# Patient Record
Sex: FEMALE | Race: WHITE | NOT HISPANIC OR LATINO | Employment: UNEMPLOYED | URBAN - METROPOLITAN AREA
[De-identification: names, ages, dates, MRNs, and addresses within clinical notes are randomized per-mention and may not be internally consistent; named-entity substitution may affect disease eponyms.]

---

## 2023-09-16 PROBLEM — E78.01 ESSENTIAL FAMILIAL HYPERCHOLESTEROLEMIA: Status: ACTIVE | Noted: 2023-09-16

## 2023-09-16 PROBLEM — E03.9 ACQUIRED HYPOTHYROIDISM: Status: ACTIVE | Noted: 2023-09-16

## 2023-09-16 PROBLEM — I10 BENIGN ESSENTIAL HYPERTENSION: Status: ACTIVE | Noted: 2023-09-16

## 2023-09-16 PROBLEM — F02.80 ALZHEIMER'S DISEASE WITH LATE ONSET (MULTI): Status: ACTIVE | Noted: 2023-09-16

## 2023-09-16 PROBLEM — G47.00 INSOMNIA: Status: ACTIVE | Noted: 2023-09-16

## 2023-09-16 PROBLEM — G62.9 NEUROPATHY: Status: ACTIVE | Noted: 2023-09-16

## 2023-09-16 PROBLEM — R53.83 FATIGUE: Status: ACTIVE | Noted: 2023-09-16

## 2023-09-16 PROBLEM — R07.9 CHEST PAIN: Status: ACTIVE | Noted: 2023-09-16

## 2023-09-16 PROBLEM — F43.21 GRIEF: Status: ACTIVE | Noted: 2023-09-16

## 2023-09-16 PROBLEM — F32.9 REACTIVE DEPRESSION: Status: ACTIVE | Noted: 2023-09-16

## 2023-09-16 PROBLEM — E11.9 DIABETES MELLITUS WITHOUT COMPLICATION (MULTI): Status: ACTIVE | Noted: 2023-09-16

## 2023-09-16 PROBLEM — G30.1 ALZHEIMER'S DISEASE WITH LATE ONSET (MULTI): Status: ACTIVE | Noted: 2023-09-16

## 2023-09-16 PROBLEM — R30.0 DYSURIA: Status: ACTIVE | Noted: 2023-09-16

## 2023-09-16 PROBLEM — M54.30 SCIATICA: Status: ACTIVE | Noted: 2023-09-16

## 2023-09-16 PROBLEM — E11.42 TYPE 2 DIABETES MELLITUS WITH DIABETIC POLYNEUROPATHY, WITHOUT LONG-TERM CURRENT USE OF INSULIN (MULTI): Status: ACTIVE | Noted: 2023-09-16

## 2023-09-16 PROBLEM — E78.00 ELEVATED CHOLESTEROL: Status: ACTIVE | Noted: 2023-09-16

## 2023-09-16 PROBLEM — D50.9 IRON DEFICIENCY ANEMIA: Status: ACTIVE | Noted: 2023-09-16

## 2023-09-16 PROBLEM — G47.01 INSOMNIA DUE TO MEDICAL CONDITION: Status: ACTIVE | Noted: 2023-09-16

## 2023-09-16 RX ORDER — FERROUS SULFATE 325(65) MG
TABLET ORAL
COMMUNITY
Start: 2013-08-22 | End: 2024-03-28 | Stop reason: ALTCHOICE

## 2023-09-16 RX ORDER — NATEGLINIDE 120 MG/1
TABLET ORAL
COMMUNITY
Start: 2015-09-10 | End: 2024-03-28 | Stop reason: ALTCHOICE

## 2023-09-16 RX ORDER — GABAPENTIN 300 MG/1
300 CAPSULE ORAL 2 TIMES DAILY
COMMUNITY
Start: 2016-08-08 | End: 2024-05-20

## 2023-09-16 RX ORDER — DONEPEZIL HYDROCHLORIDE 10 MG/1
10 TABLET, FILM COATED ORAL DAILY
COMMUNITY
End: 2024-01-22

## 2023-09-16 RX ORDER — BLOOD SUGAR DIAGNOSTIC
STRIP MISCELLANEOUS
COMMUNITY
Start: 2014-09-08 | End: 2024-03-28 | Stop reason: SDUPTHER

## 2023-09-16 RX ORDER — ACETAMINOPHEN 500 MG
TABLET ORAL
COMMUNITY
End: 2024-03-28 | Stop reason: ALTCHOICE

## 2023-09-16 RX ORDER — CYCLOBENZAPRINE HCL 10 MG
5 TABLET ORAL 3 TIMES DAILY PRN
COMMUNITY
End: 2024-03-28 | Stop reason: ALTCHOICE

## 2023-09-16 RX ORDER — ATORVASTATIN CALCIUM 20 MG/1
20 TABLET, FILM COATED ORAL DAILY
COMMUNITY
Start: 2013-08-22

## 2023-09-16 RX ORDER — METOPROLOL SUCCINATE 50 MG/1
TABLET, EXTENDED RELEASE ORAL
COMMUNITY
Start: 2022-09-27

## 2023-09-16 RX ORDER — METFORMIN HYDROCHLORIDE 500 MG/1
500 TABLET ORAL
COMMUNITY
Start: 2015-08-05 | End: 2024-03-28 | Stop reason: ALTCHOICE

## 2023-09-16 RX ORDER — GLIPIZIDE 5 MG/1
5 TABLET ORAL
COMMUNITY
Start: 2013-08-22 | End: 2024-01-22

## 2023-09-16 RX ORDER — LEVOTHYROXINE SODIUM 50 UG/1
50 TABLET ORAL
COMMUNITY
Start: 2013-08-22 | End: 2024-03-28 | Stop reason: ALTCHOICE

## 2023-09-16 RX ORDER — MEMANTINE HYDROCHLORIDE 28 MG/1
CAPSULE, EXTENDED RELEASE ORAL
COMMUNITY
End: 2024-01-22

## 2023-09-16 RX ORDER — NYSTATIN 100000 U/G
1 CREAM TOPICAL 2 TIMES DAILY
COMMUNITY
Start: 2022-12-13 | End: 2024-03-28 | Stop reason: ALTCHOICE

## 2023-09-16 RX ORDER — METOPROLOL TARTRATE 50 MG/1
25 TABLET ORAL 2 TIMES DAILY
COMMUNITY
Start: 2013-08-22 | End: 2024-03-28 | Stop reason: ALTCHOICE

## 2023-09-16 RX ORDER — TEMAZEPAM 15 MG/1
CAPSULE ORAL
COMMUNITY
Start: 2015-12-22 | End: 2023-11-27 | Stop reason: SDUPTHER

## 2023-11-27 ENCOUNTER — TELEPHONE (OUTPATIENT)
Dept: PRIMARY CARE | Facility: CLINIC | Age: 83
End: 2023-11-27
Payer: MEDICARE

## 2023-11-27 DIAGNOSIS — G47.01 INSOMNIA DUE TO MEDICAL CONDITION: Primary | ICD-10-CM

## 2023-11-27 NOTE — TELEPHONE ENCOUNTER
Pt's daughter is calling for a Refill: Temazepam 15 mg caps 1 cap at bedtime # 30 Pharm: [facility]

## 2023-11-28 RX ORDER — TEMAZEPAM 15 MG/1
15 CAPSULE ORAL NIGHTLY
Qty: 30 CAPSULE | Refills: 2 | Status: SHIPPED | OUTPATIENT
Start: 2023-11-28 | End: 2024-03-17

## 2023-12-19 ENCOUNTER — APPOINTMENT (OUTPATIENT)
Dept: PRIMARY CARE | Facility: CLINIC | Age: 83
End: 2023-12-19
Payer: MEDICARE

## 2024-01-12 ENCOUNTER — APPOINTMENT (OUTPATIENT)
Dept: PRIMARY CARE | Facility: CLINIC | Age: 84
End: 2024-01-12
Payer: MEDICARE

## 2024-01-21 DIAGNOSIS — E11.9 TYPE 2 DIABETES MELLITUS WITHOUT COMPLICATIONS (MULTI): ICD-10-CM

## 2024-01-22 RX ORDER — MEMANTINE HYDROCHLORIDE 28 MG/1
28 CAPSULE, EXTENDED RELEASE ORAL DAILY
Qty: 90 CAPSULE | Refills: 0 | Status: SHIPPED | OUTPATIENT
Start: 2024-01-22 | End: 2024-05-20

## 2024-01-22 RX ORDER — GLIPIZIDE 5 MG/1
10 TABLET ORAL 2 TIMES DAILY
Qty: 360 TABLET | Refills: 0 | Status: SHIPPED | OUTPATIENT
Start: 2024-01-22 | End: 2024-05-20

## 2024-01-22 RX ORDER — DONEPEZIL HYDROCHLORIDE 10 MG/1
10 TABLET, FILM COATED ORAL NIGHTLY
Qty: 90 TABLET | Refills: 0 | Status: SHIPPED | OUTPATIENT
Start: 2024-01-22 | End: 2024-05-20

## 2024-03-11 ENCOUNTER — APPOINTMENT (OUTPATIENT)
Dept: PRIMARY CARE | Facility: CLINIC | Age: 84
End: 2024-03-11
Payer: MEDICARE

## 2024-03-17 DIAGNOSIS — G47.01 INSOMNIA DUE TO MEDICAL CONDITION: ICD-10-CM

## 2024-03-17 RX ORDER — TEMAZEPAM 15 MG/1
15 CAPSULE ORAL NIGHTLY
Qty: 30 CAPSULE | Refills: 1 | Status: SHIPPED | OUTPATIENT
Start: 2024-03-17 | End: 2024-06-15

## 2024-03-28 ENCOUNTER — LAB (OUTPATIENT)
Dept: LAB | Facility: LAB | Age: 84
End: 2024-03-28
Payer: MEDICARE

## 2024-03-28 ENCOUNTER — OFFICE VISIT (OUTPATIENT)
Dept: PRIMARY CARE | Facility: CLINIC | Age: 84
End: 2024-03-28
Payer: MEDICARE

## 2024-03-28 VITALS
BODY MASS INDEX: 23.19 KG/M2 | SYSTOLIC BLOOD PRESSURE: 134 MMHG | HEART RATE: 62 BPM | HEIGHT: 62 IN | DIASTOLIC BLOOD PRESSURE: 68 MMHG | OXYGEN SATURATION: 96 % | WEIGHT: 126 LBS

## 2024-03-28 DIAGNOSIS — F51.01 PRIMARY INSOMNIA: ICD-10-CM

## 2024-03-28 DIAGNOSIS — E78.2 MIXED HYPERLIPIDEMIA: ICD-10-CM

## 2024-03-28 DIAGNOSIS — N18.31 STAGE 3A CHRONIC KIDNEY DISEASE (MULTI): ICD-10-CM

## 2024-03-28 DIAGNOSIS — E11.42 TYPE 2 DIABETES MELLITUS WITH DIABETIC POLYNEUROPATHY, WITHOUT LONG-TERM CURRENT USE OF INSULIN (MULTI): ICD-10-CM

## 2024-03-28 DIAGNOSIS — Z76.89 ENCOUNTER TO ESTABLISH CARE: Primary | ICD-10-CM

## 2024-03-28 DIAGNOSIS — Z79.899 CONTROLLED SUBSTANCE AGREEMENT SIGNED: ICD-10-CM

## 2024-03-28 DIAGNOSIS — F02.80 ALZHEIMER'S DISEASE WITH LATE ONSET (MULTI): ICD-10-CM

## 2024-03-28 DIAGNOSIS — G30.1 ALZHEIMER'S DISEASE WITH LATE ONSET (MULTI): ICD-10-CM

## 2024-03-28 PROBLEM — G47.01 INSOMNIA DUE TO MEDICAL CONDITION: Status: RESOLVED | Noted: 2023-09-16 | Resolved: 2024-03-28

## 2024-03-28 PROBLEM — E11.9 DIABETES MELLITUS WITHOUT COMPLICATION (MULTI): Status: RESOLVED | Noted: 2023-09-16 | Resolved: 2024-03-28

## 2024-03-28 PROBLEM — R30.0 DYSURIA: Status: RESOLVED | Noted: 2023-09-16 | Resolved: 2024-03-28

## 2024-03-28 PROBLEM — E78.00 ELEVATED CHOLESTEROL: Status: RESOLVED | Noted: 2023-09-16 | Resolved: 2024-03-28

## 2024-03-28 PROBLEM — R07.9 CHEST PAIN: Status: RESOLVED | Noted: 2023-09-16 | Resolved: 2024-03-28

## 2024-03-28 PROBLEM — E03.9 ACQUIRED HYPOTHYROIDISM: Status: RESOLVED | Noted: 2023-09-16 | Resolved: 2024-03-28

## 2024-03-28 PROBLEM — G62.9 NEUROPATHY: Status: RESOLVED | Noted: 2023-09-16 | Resolved: 2024-03-28

## 2024-03-28 PROBLEM — R53.83 FATIGUE: Status: RESOLVED | Noted: 2023-09-16 | Resolved: 2024-03-28

## 2024-03-28 PROBLEM — E78.01 ESSENTIAL FAMILIAL HYPERCHOLESTEROLEMIA: Status: RESOLVED | Noted: 2023-09-16 | Resolved: 2024-03-28

## 2024-03-28 PROBLEM — I10 BENIGN ESSENTIAL HYPERTENSION: Status: RESOLVED | Noted: 2023-09-16 | Resolved: 2024-03-28

## 2024-03-28 LAB
ALBUMIN SERPL-MCNC: 4.4 G/DL (ref 3.5–5)
ALP BLD-CCNC: 99 U/L (ref 35–125)
ALT SERPL-CCNC: 35 U/L (ref 5–40)
AMPHETAMINES UR QL SCN>1000 NG/ML: NEGATIVE
ANION GAP SERPL CALC-SCNC: 12 MMOL/L
AST SERPL-CCNC: 35 U/L (ref 5–40)
BARBITURATES UR QL SCN>300 NG/ML: NEGATIVE
BASOPHILS # BLD AUTO: 0.07 X10*3/UL (ref 0–0.1)
BASOPHILS NFR BLD AUTO: 1.1 %
BENZODIAZ UR QL SCN>300 NG/ML: NEGATIVE
BILIRUB SERPL-MCNC: 0.7 MG/DL (ref 0.1–1.2)
BUN SERPL-MCNC: 10 MG/DL (ref 8–25)
BZE UR QL SCN>300 NG/ML: NEGATIVE
CALCIUM SERPL-MCNC: 9.7 MG/DL (ref 8.5–10.4)
CANNABINOIDS UR QL SCN>50 NG/ML: NEGATIVE
CHLORIDE SERPL-SCNC: 99 MMOL/L (ref 97–107)
CHOLEST SERPL-MCNC: 150 MG/DL (ref 133–200)
CHOLEST/HDLC SERPL: 2.1 {RATIO}
CO2 SERPL-SCNC: 25 MMOL/L (ref 24–31)
CREAT SERPL-MCNC: 1 MG/DL (ref 0.4–1.6)
CREAT UR-MCNC: 60.7 MG/DL
EGFRCR SERPLBLD CKD-EPI 2021: 56 ML/MIN/1.73M*2
EOSINOPHIL # BLD AUTO: 0.11 X10*3/UL (ref 0–0.4)
EOSINOPHIL NFR BLD AUTO: 1.8 %
ERYTHROCYTE [DISTWIDTH] IN BLOOD BY AUTOMATED COUNT: 14.9 % (ref 11.5–14.5)
EST. AVERAGE GLUCOSE BLD GHB EST-MCNC: 157 MG/DL
FENTANYL+NORFENTANYL UR QL SCN: NEGATIVE
GLUCOSE SERPL-MCNC: 82 MG/DL (ref 65–99)
HBA1C MFR BLD: 7.1 %
HCT VFR BLD AUTO: 41.3 % (ref 36–46)
HDLC SERPL-MCNC: 71 MG/DL
HGB BLD-MCNC: 13.3 G/DL (ref 12–16)
IMM GRANULOCYTES # BLD AUTO: 0.02 X10*3/UL (ref 0–0.5)
IMM GRANULOCYTES NFR BLD AUTO: 0.3 % (ref 0–0.9)
LDLC SERPL CALC-MCNC: 63 MG/DL (ref 65–130)
LYMPHOCYTES # BLD AUTO: 1.58 X10*3/UL (ref 0.8–3)
LYMPHOCYTES NFR BLD AUTO: 25.4 %
MCH RBC QN AUTO: 28.9 PG (ref 26–34)
MCHC RBC AUTO-ENTMCNC: 32.2 G/DL (ref 32–36)
MCV RBC AUTO: 90 FL (ref 80–100)
METHADONE UR QL SCN>300 NG/ML: NEGATIVE
MICROALBUMIN UR-MCNC: <12 MG/L (ref 0–23)
MICROALBUMIN/CREAT UR: NORMAL MG/G{CREAT}
MONOCYTES # BLD AUTO: 0.54 X10*3/UL (ref 0.05–0.8)
MONOCYTES NFR BLD AUTO: 8.7 %
NEUTROPHILS # BLD AUTO: 3.89 X10*3/UL (ref 1.6–5.5)
NEUTROPHILS NFR BLD AUTO: 62.7 %
NRBC BLD-RTO: 0 /100 WBCS (ref 0–0)
OPIATES UR QL SCN>300 NG/ML: NEGATIVE
OXYCODONE UR QL: NEGATIVE
PCP UR QL SCN>25 NG/ML: NEGATIVE
PLATELET # BLD AUTO: 186 X10*3/UL (ref 150–450)
POTASSIUM SERPL-SCNC: 4.5 MMOL/L (ref 3.4–5.1)
PROT SERPL-MCNC: 7.7 G/DL (ref 5.9–7.9)
RBC # BLD AUTO: 4.61 X10*6/UL (ref 4–5.2)
SODIUM SERPL-SCNC: 136 MMOL/L (ref 133–145)
TRIGL SERPL-MCNC: 78 MG/DL (ref 40–150)
WBC # BLD AUTO: 6.2 X10*3/UL (ref 4.4–11.3)

## 2024-03-28 PROCEDURE — 99214 OFFICE O/P EST MOD 30 MIN: CPT

## 2024-03-28 PROCEDURE — 80053 COMPREHEN METABOLIC PANEL: CPT

## 2024-03-28 PROCEDURE — 82043 UR ALBUMIN QUANTITATIVE: CPT

## 2024-03-28 PROCEDURE — 36415 COLL VENOUS BLD VENIPUNCTURE: CPT

## 2024-03-28 PROCEDURE — 83036 HEMOGLOBIN GLYCOSYLATED A1C: CPT

## 2024-03-28 PROCEDURE — 80061 LIPID PANEL: CPT

## 2024-03-28 PROCEDURE — 82570 ASSAY OF URINE CREATININE: CPT

## 2024-03-28 PROCEDURE — 1123F ACP DISCUSS/DSCN MKR DOCD: CPT

## 2024-03-28 PROCEDURE — 1157F ADVNC CARE PLAN IN RCRD: CPT

## 2024-03-28 PROCEDURE — 80307 DRUG TEST PRSMV CHEM ANLYZR: CPT

## 2024-03-28 PROCEDURE — 1036F TOBACCO NON-USER: CPT

## 2024-03-28 PROCEDURE — 1126F AMNT PAIN NOTED NONE PRSNT: CPT

## 2024-03-28 PROCEDURE — 1159F MED LIST DOCD IN RCRD: CPT

## 2024-03-28 PROCEDURE — 99204 OFFICE O/P NEW MOD 45 MIN: CPT

## 2024-03-28 PROCEDURE — 85025 COMPLETE CBC W/AUTO DIFF WBC: CPT

## 2024-03-28 RX ORDER — BLOOD SUGAR DIAGNOSTIC
1 STRIP MISCELLANEOUS DAILY
Qty: 100 STRIP | Refills: 2 | Status: SHIPPED | OUTPATIENT
Start: 2024-03-28 | End: 2024-04-11 | Stop reason: ALTCHOICE

## 2024-03-28 ASSESSMENT — PATIENT HEALTH QUESTIONNAIRE - PHQ9
SUM OF ALL RESPONSES TO PHQ9 QUESTIONS 1 AND 2: 0
2. FEELING DOWN, DEPRESSED OR HOPELESS: NOT AT ALL
1. LITTLE INTEREST OR PLEASURE IN DOING THINGS: NOT AT ALL

## 2024-03-28 ASSESSMENT — ENCOUNTER SYMPTOMS
SHORTNESS OF BREATH: 0
FEVER: 0
CHILLS: 0

## 2024-03-28 ASSESSMENT — PAIN SCALES - GENERAL: PAINLEVEL: 0-NO PAIN

## 2024-03-28 NOTE — PROGRESS NOTES
"Subjective   Patient ID: Mariya Mckeon is a 83 y.o. female who presents for Establish Care (No concerns, due for bloodwork ) and Med Refill (One touch test strips).    Hx of NIDDM, Alzheimer's disease, HTN    Other providers: Dr. Merritt (Podiatrist, for nail trimming)    Alzheimer's disease: Aricept 10 mg and 28 mg Namenda ER. Daughter not sure if medication is helping or not.     Insomnia: 15 mg Restoril, started years ago when  passed away. Daughter states has been trying to decrease use. Willing to try lower dose next fill.     HLD: stable on 20 mg Atorvastatin. Tolerating medication with no side effects. Last lipid 5/2024.    DM with neuropathy: Last A1C 7.1 (9/11/2023). On 10 mg Glipizide BID. Also on Gabapentin 300 mg BID. Sugar levels 100's. Last microalbumin and GFR DUE. On statin yes, no ACEI. Last eye exam due (does not have ophthalmology. Last foot exam through podiatrist. PNA UTD. Denies hypoglycemic incidents, polyphagia, polydipsia, polyuria.       Tachycardia: controlled with Metoprolol 50 mg.     SHx: patient lives with daughter.              Review of Systems   Constitutional:  Negative for chills and fever.   Respiratory:  Negative for shortness of breath.    Cardiovascular:  Negative for chest pain.       Objective   /68   Pulse 62   Ht 1.575 m (5' 2\")   Wt 57.2 kg (126 lb)   SpO2 96%   BMI 23.05 kg/m²     Physical Exam  Constitutional:       General: She is not in acute distress.     Appearance: Normal appearance.   Cardiovascular:      Rate and Rhythm: Normal rate and regular rhythm.      Heart sounds: No murmur heard.  Pulmonary:      Effort: Pulmonary effort is normal.      Breath sounds: Normal breath sounds. No wheezing, rhonchi or rales.   Musculoskeletal:      Right lower leg: No edema.      Left lower leg: No edema.   Skin:     General: Skin is warm and dry.      Findings: No rash.   Neurological:      Mental Status: She is alert.   Psychiatric:         Mood and Affect: " Mood and affect normal.         Assessment/Plan   Diagnoses and all orders for this visit:  Encounter to establish care  Type 2 diabetes mellitus with diabetic polyneuropathy, without long-term current use of insulin (CMS/[facility])  -     OneTouch Ultra Test strip; 1 strip by in vitro route once daily.  -     Hemoglobin A1c; Future  -     Comprehensive metabolic panel; Future  -     Albumin, urine, random; Future  -     CBC and Auto Differential; Future  Stage 3a chronic kidney disease (CMS/[facility])  -     Comprehensive metabolic panel; Future  Alzheimer's disease with late onset (CMS/[facility])  Mixed hyperlipidemia  -     Lipid panel; Future  Primary insomnia  Controlled substance agreement signed  -     Drug Screen, Urine With Reflex to Confirmation; Future  Follow-up 6 months (Agreed every 6 month visit for dm, and then a Medicare physical between the two visits separate)

## 2024-03-29 ENCOUNTER — TELEPHONE (OUTPATIENT)
Dept: PRIMARY CARE | Facility: CLINIC | Age: 84
End: 2024-03-29
Payer: MEDICARE

## 2024-03-29 NOTE — TELEPHONE ENCOUNTER
All labs stable with an A1c of 7.1. ideally would like A1c to be under 7.0 but as long as we can keep it in the low 7's we can avoid starting patient on medication.

## 2024-04-11 ENCOUNTER — TELEPHONE (OUTPATIENT)
Dept: PRIMARY CARE | Facility: CLINIC | Age: 84
End: 2024-04-11
Payer: MEDICARE

## 2024-04-11 DIAGNOSIS — E11.42 TYPE 2 DIABETES MELLITUS WITH DIABETIC POLYNEUROPATHY, WITHOUT LONG-TERM CURRENT USE OF INSULIN (MULTI): Primary | ICD-10-CM

## 2024-04-11 RX ORDER — BLOOD SUGAR DIAGNOSTIC
1 STRIP MISCELLANEOUS DAILY
Qty: 100 STRIP | Refills: 1 | Status: SHIPPED | OUTPATIENT
Start: 2024-04-11

## 2024-04-11 NOTE — TELEPHONE ENCOUNTER
The pharmacy called stating they received a prescription for OneTouch Ultra, but the patient uses Accu- chek sammy plus.   ROSELINE was placed on the order so they pharmacy is unable to replace it and requires a new prescription.

## 2024-05-17 DIAGNOSIS — E11.42 TYPE 2 DIABETES MELLITUS WITH DIABETIC POLYNEUROPATHY, WITHOUT LONG-TERM CURRENT USE OF INSULIN (MULTI): Primary | ICD-10-CM

## 2024-05-17 DIAGNOSIS — F02.80 ALZHEIMER'S DISEASE WITH LATE ONSET (MULTI): Primary | ICD-10-CM

## 2024-05-17 DIAGNOSIS — G30.1 ALZHEIMER'S DISEASE WITH LATE ONSET (MULTI): Primary | ICD-10-CM

## 2024-05-17 DIAGNOSIS — E11.9 TYPE 2 DIABETES MELLITUS WITHOUT COMPLICATIONS (MULTI): ICD-10-CM

## 2024-05-20 RX ORDER — DONEPEZIL HYDROCHLORIDE 10 MG/1
10 TABLET, FILM COATED ORAL NIGHTLY
Qty: 90 TABLET | Refills: 1 | Status: SHIPPED | OUTPATIENT
Start: 2024-05-20

## 2024-05-20 RX ORDER — MEMANTINE HYDROCHLORIDE 28 MG/1
28 CAPSULE, EXTENDED RELEASE ORAL DAILY
Qty: 90 CAPSULE | Refills: 1 | Status: SHIPPED | OUTPATIENT
Start: 2024-05-20

## 2024-05-20 RX ORDER — GABAPENTIN 300 MG/1
CAPSULE ORAL
Qty: 180 CAPSULE | Refills: 1 | Status: SHIPPED | OUTPATIENT
Start: 2024-05-20

## 2024-05-20 RX ORDER — GLIPIZIDE 5 MG/1
10 TABLET ORAL 2 TIMES DAILY
Qty: 360 TABLET | Refills: 1 | Status: SHIPPED | OUTPATIENT
Start: 2024-05-20

## 2024-09-14 DIAGNOSIS — I10 PRIMARY HYPERTENSION: ICD-10-CM

## 2024-09-14 DIAGNOSIS — E78.2 MIXED HYPERLIPIDEMIA: Primary | ICD-10-CM

## 2024-09-14 DIAGNOSIS — E11.9 TYPE 2 DIABETES MELLITUS WITHOUT COMPLICATIONS (MULTI): ICD-10-CM

## 2024-09-17 RX ORDER — ATORVASTATIN CALCIUM 20 MG/1
20 TABLET, FILM COATED ORAL DAILY
Qty: 90 TABLET | Refills: 3 | Status: SHIPPED | OUTPATIENT
Start: 2024-09-17

## 2024-09-17 RX ORDER — METOPROLOL SUCCINATE 50 MG/1
50 TABLET, EXTENDED RELEASE ORAL DAILY
Qty: 90 TABLET | Refills: 3 | Status: SHIPPED | OUTPATIENT
Start: 2024-09-17

## 2024-10-03 ENCOUNTER — OFFICE VISIT (OUTPATIENT)
Dept: PRIMARY CARE | Facility: CLINIC | Age: 84
End: 2024-10-03
Payer: MEDICARE

## 2024-10-03 VITALS
WEIGHT: 127 LBS | OXYGEN SATURATION: 96 % | BODY MASS INDEX: 23.37 KG/M2 | SYSTOLIC BLOOD PRESSURE: 112 MMHG | DIASTOLIC BLOOD PRESSURE: 64 MMHG | HEART RATE: 58 BPM | HEIGHT: 62 IN

## 2024-10-03 DIAGNOSIS — E78.2 MIXED HYPERLIPIDEMIA: ICD-10-CM

## 2024-10-03 DIAGNOSIS — F02.80 ALZHEIMER'S DISEASE WITH LATE ONSET (MULTI): ICD-10-CM

## 2024-10-03 DIAGNOSIS — G30.1 ALZHEIMER'S DISEASE WITH LATE ONSET (MULTI): ICD-10-CM

## 2024-10-03 DIAGNOSIS — R00.0 TACHYCARDIA: ICD-10-CM

## 2024-10-03 DIAGNOSIS — E11.42 TYPE 2 DIABETES MELLITUS WITH DIABETIC POLYNEUROPATHY, WITHOUT LONG-TERM CURRENT USE OF INSULIN: Primary | ICD-10-CM

## 2024-10-03 PROBLEM — F43.21 GRIEF: Status: RESOLVED | Noted: 2023-09-16 | Resolved: 2024-10-03

## 2024-10-03 PROBLEM — M54.30 SCIATICA: Status: RESOLVED | Noted: 2023-09-16 | Resolved: 2024-10-03

## 2024-10-03 PROBLEM — G47.00 INSOMNIA: Status: RESOLVED | Noted: 2023-09-16 | Resolved: 2024-10-03

## 2024-10-03 PROBLEM — D50.9 IRON DEFICIENCY ANEMIA: Status: RESOLVED | Noted: 2023-09-16 | Resolved: 2024-10-03

## 2024-10-03 PROBLEM — F32.9 REACTIVE DEPRESSION: Status: RESOLVED | Noted: 2023-09-16 | Resolved: 2024-10-03

## 2024-10-03 LAB — POC HEMOGLOBIN A1C: 7.1 % (ref 4.2–6.5)

## 2024-10-03 PROCEDURE — 1036F TOBACCO NON-USER: CPT

## 2024-10-03 PROCEDURE — 90662 IIV NO PRSV INCREASED AG IM: CPT

## 2024-10-03 PROCEDURE — 99214 OFFICE O/P EST MOD 30 MIN: CPT

## 2024-10-03 PROCEDURE — 1126F AMNT PAIN NOTED NONE PRSNT: CPT

## 2024-10-03 PROCEDURE — 1160F RVW MEDS BY RX/DR IN RCRD: CPT

## 2024-10-03 PROCEDURE — 1123F ACP DISCUSS/DSCN MKR DOCD: CPT

## 2024-10-03 PROCEDURE — 83036 HEMOGLOBIN GLYCOSYLATED A1C: CPT

## 2024-10-03 PROCEDURE — 1157F ADVNC CARE PLAN IN RCRD: CPT

## 2024-10-03 PROCEDURE — 1159F MED LIST DOCD IN RCRD: CPT

## 2024-10-03 ASSESSMENT — ENCOUNTER SYMPTOMS
POLYDIPSIA: 0
FEVER: 0
CHILLS: 0
POLYPHAGIA: 0
PALPITATIONS: 0

## 2024-10-03 ASSESSMENT — PATIENT HEALTH QUESTIONNAIRE - PHQ9
2. FEELING DOWN, DEPRESSED OR HOPELESS: NOT AT ALL
1. LITTLE INTEREST OR PLEASURE IN DOING THINGS: NOT AT ALL
SUM OF ALL RESPONSES TO PHQ9 QUESTIONS 1 AND 2: 0

## 2024-10-03 ASSESSMENT — PAIN SCALES - GENERAL: PAINLEVEL: 0-NO PAIN

## 2024-10-03 NOTE — PROGRESS NOTES
"Subjective   Patient ID: Mariya Mckeon is a 84 y.o. female who presents for Diabetes (Follow up /la).    Hx of NIDDM, Alzheimer's disease, HTN    Other providers: Dr. Merritt (Podiatrist, for nail trimming)    Alzheimer's disease: Aricept 10 mg and 28 mg Namenda ER. Daughter not sure if medication is helping or not, but patient has no side effects. Per daughter over last 6 months has seen some decline in cognitive function.     Insomnia: Was on 15 mg Restoril, but was able to stopped completely this summer. Has not problems sleeping without. Started years ago when  first passed away.    HLD: stable on 20 mg Atorvastatin. Tolerating medication with no side effects. Last lipid 5/2024.    DM with neuropathy: Last A1C 7.1 (3/28/24) --> 7.1 (10/3/2024). On 10 mg Glipizide BID. Also on Gabapentin 300 mg BID. Sugar levels 100's. Last microalbumin normal (3/2024) and GFR 56 (3/2024). On statin yes, no ACEI. Last eye exam due (does not have ophthalmology, will plan to schedule). Last foot exam through podiatrist. PNA UTD. Denies hypoglycemic incidents, polyphagia, polydipsia, polyuria.       Tachycardia: controlled with Metoprolol 50 mg.     SHx: patient lives with daughter who provided most HPI.        Review of Systems   Constitutional:  Negative for chills and fever.   Cardiovascular:  Negative for chest pain and palpitations.   Endocrine: Negative for polydipsia, polyphagia and polyuria.       Objective   /64   Pulse 58   Ht 1.575 m (5' 2\")   Wt 57.6 kg (127 lb)   SpO2 96%   BMI 23.23 kg/m²     Physical Exam  Constitutional:       General: She is not in acute distress.     Appearance: Normal appearance.   Cardiovascular:      Rate and Rhythm: Normal rate and regular rhythm.      Heart sounds: No murmur heard.  Pulmonary:      Effort: Pulmonary effort is normal.      Breath sounds: Normal breath sounds. No wheezing, rhonchi or rales.   Skin:     General: Skin is warm and dry.      Findings: No rash. "   Neurological:      Mental Status: She is alert.   Psychiatric:         Mood and Affect: Mood and affect normal.         Assessment/Plan   Diagnoses and all orders for this visit:  Type 2 diabetes mellitus with diabetic polyneuropathy, without long-term current use of insulin  -     POCT glycosylated hemoglobin (Hb A1C) manually resulted  Alzheimer's disease with late onset (Multi)  Tachycardia  Mixed hyperlipidemia  Other orders  -     Flu vaccine, trivalent, preservative free, HIGH-DOSE, age 65y+ (Fluzone)  Follow-up 6 months for Medicare Physical. Will order A1c next visit with rest of labs.

## 2024-10-10 ENCOUNTER — TELEPHONE (OUTPATIENT)
Dept: PRIMARY CARE | Facility: CLINIC | Age: 84
End: 2024-10-10
Payer: MEDICARE

## 2024-10-10 NOTE — TELEPHONE ENCOUNTER
Lisa received paperwork for this patient and wants her daughter to schedule a virtual appt to discuss it further.    She said we could use a same day for her if available.     I left message for the daughter, waiting on call back.

## 2024-10-23 ENCOUNTER — TELEMEDICINE (OUTPATIENT)
Dept: PRIMARY CARE | Facility: CLINIC | Age: 84
End: 2024-10-23
Payer: MEDICARE

## 2024-10-23 ASSESSMENT — COLUMBIA-SUICIDE SEVERITY RATING SCALE - C-SSRS
6. HAVE YOU EVER DONE ANYTHING, STARTED TO DO ANYTHING, OR PREPARED TO DO ANYTHING TO END YOUR LIFE?: NO
2. HAVE YOU ACTUALLY HAD ANY THOUGHTS OF KILLING YOURSELF?: NO
1. IN THE PAST MONTH, HAVE YOU WISHED YOU WERE DEAD OR WISHED YOU COULD GO TO SLEEP AND NOT WAKE UP?: NO

## 2024-10-23 ASSESSMENT — PATIENT HEALTH QUESTIONNAIRE - PHQ9
1. LITTLE INTEREST OR PLEASURE IN DOING THINGS: NOT AT ALL
SUM OF ALL RESPONSES TO PHQ9 QUESTIONS 1 AND 2: 0
2. FEELING DOWN, DEPRESSED OR HOPELESS: NOT AT ALL

## 2024-10-23 NOTE — PROGRESS NOTES
Subjective   Patient ID: Mariya Mckeon is a 84 y.o. female who presents for paperwork  (Pt is here to discuss paperwork for assistant living / nr).    HPI     Review of Systems    Objective   There were no vitals taken for this visit.    Physical Exam    Assessment/Plan   {Assess/PlanSmartLinks:21703}

## 2024-10-30 ENCOUNTER — TELEPHONE (OUTPATIENT)
Dept: PRIMARY CARE | Facility: CLINIC | Age: 84
End: 2024-10-30
Payer: MEDICARE

## 2024-12-03 ENCOUNTER — LAB (OUTPATIENT)
Dept: LAB | Facility: LAB | Age: 84
End: 2024-12-03
Payer: MEDICARE

## 2024-12-03 DIAGNOSIS — Z11.1 SCREENING FOR TUBERCULOSIS: ICD-10-CM

## 2024-12-03 DIAGNOSIS — G30.1 ALZHEIMER'S DISEASE WITH LATE ONSET (MULTI): ICD-10-CM

## 2024-12-03 DIAGNOSIS — E11.42 TYPE 2 DIABETES MELLITUS WITH DIABETIC POLYNEUROPATHY, WITHOUT LONG-TERM CURRENT USE OF INSULIN: ICD-10-CM

## 2024-12-03 DIAGNOSIS — E11.9 TYPE 2 DIABETES MELLITUS WITHOUT COMPLICATIONS (MULTI): ICD-10-CM

## 2024-12-03 DIAGNOSIS — F02.80 ALZHEIMER'S DISEASE WITH LATE ONSET (MULTI): ICD-10-CM

## 2024-12-03 DIAGNOSIS — Z11.1 SCREENING FOR TUBERCULOSIS: Primary | ICD-10-CM

## 2024-12-03 PROCEDURE — 36415 COLL VENOUS BLD VENIPUNCTURE: CPT

## 2024-12-03 PROCEDURE — 86481 TB AG RESPONSE T-CELL SUSP: CPT

## 2024-12-03 RX ORDER — MEMANTINE HYDROCHLORIDE 28 MG/1
28 CAPSULE, EXTENDED RELEASE ORAL DAILY
Qty: 90 CAPSULE | Refills: 1 | Status: SHIPPED | OUTPATIENT
Start: 2024-12-03

## 2024-12-03 RX ORDER — GABAPENTIN 300 MG/1
300 CAPSULE ORAL 2 TIMES DAILY
Qty: 180 CAPSULE | Refills: 1 | Status: SHIPPED | OUTPATIENT
Start: 2024-12-03

## 2024-12-03 RX ORDER — DONEPEZIL HYDROCHLORIDE 5 MG/1
5 TABLET, FILM COATED ORAL NIGHTLY
Qty: 30 TABLET | Refills: 0 | Status: SHIPPED | OUTPATIENT
Start: 2024-12-03

## 2024-12-03 RX ORDER — GLIPIZIDE 5 MG/1
10 TABLET ORAL 2 TIMES DAILY
Qty: 360 TABLET | Refills: 1 | Status: SHIPPED | OUTPATIENT
Start: 2024-12-03

## 2024-12-03 NOTE — TELEPHONE ENCOUNTER
Spoke with Leticia, confirmed information and she said they went to get her TB test done today.    Her daughter said she is supposed to go there at 3pm today.

## 2024-12-03 NOTE — TELEPHONE ENCOUNTER
TB ordered.    Sent in lower dose of Aricept to take for just 30 days, then can stop medication. For now will keep on the Namenda, but can consider stopping in the future.     Rest of medications filled.

## 2024-12-03 NOTE — TELEPHONE ENCOUNTER
Paperwork was filled out for patients admission to [facility], the only thing the patient needs prior to that is not done is TB testing.    I left message for patients daughter, Leticia that this would be need to be done with results back to us before the paperwork could be faxed. Please place order. See attached rx, last appt was 10/23/24

## 2024-12-05 ENCOUNTER — TELEPHONE (OUTPATIENT)
Dept: PRIMARY CARE | Facility: CLINIC | Age: 84
End: 2024-12-05
Payer: MEDICARE

## 2024-12-05 LAB
NIL(NEG) CONTROL SPOT COUNT: NORMAL
PANEL A SPOT COUNT: 0
PANEL B SPOT COUNT: 3
POS CONTROL SPOT COUNT: NORMAL
T-SPOT. TB INTERPRETATION: NEGATIVE

## 2024-12-05 NOTE — TELEPHONE ENCOUNTER
Spoke with patients daughter, confirmed results and that I was faxing paperwork over to [facility]. Paperwork faxed

## 2025-02-17 ENCOUNTER — PATIENT MESSAGE (OUTPATIENT)
Dept: PRIMARY CARE | Facility: CLINIC | Age: 85
End: 2025-02-17
Payer: MEDICARE

## 2025-02-17 DIAGNOSIS — F02.80 ALZHEIMER'S DISEASE WITH LATE ONSET (MULTI): ICD-10-CM

## 2025-02-17 DIAGNOSIS — G30.1 ALZHEIMER'S DISEASE WITH LATE ONSET (MULTI): ICD-10-CM

## 2025-02-18 RX ORDER — DONEPEZIL HYDROCHLORIDE 5 MG/1
5 TABLET, FILM COATED ORAL NIGHTLY
Qty: 90 TABLET | Refills: 1 | Status: SHIPPED | OUTPATIENT
Start: 2025-02-18

## 2025-02-20 ENCOUNTER — TELEPHONE (OUTPATIENT)
Dept: PRIMARY CARE | Facility: CLINIC | Age: 85
End: 2025-02-20
Payer: MEDICARE

## 2025-02-20 DIAGNOSIS — R41.82 ALTERED MENTAL STATUS, UNSPECIFIED ALTERED MENTAL STATUS TYPE: Primary | ICD-10-CM

## 2025-02-21 ENCOUNTER — TELEPHONE (OUTPATIENT)
Dept: PRIMARY CARE | Facility: CLINIC | Age: 85
End: 2025-02-21
Payer: MEDICARE

## 2025-02-21 DIAGNOSIS — K59.01 SLOW TRANSIT CONSTIPATION: Primary | ICD-10-CM

## 2025-02-21 PROCEDURE — 87086 URINE CULTURE/COLONY COUNT: CPT | Mod: OUT,GEALAB

## 2025-02-21 PROCEDURE — 81001 URINALYSIS AUTO W/SCOPE: CPT | Mod: OUT

## 2025-02-21 NOTE — TELEPHONE ENCOUNTER
Barbara from [facility] called and stated patient is constipated/almost impacted and needs an order for a stool softener placed.  She needs a written order sent to [phone].

## 2025-02-22 ENCOUNTER — LAB REQUISITION (OUTPATIENT)
Dept: LAB | Facility: HOSPITAL | Age: 85
End: 2025-02-22
Payer: MEDICARE

## 2025-02-22 DIAGNOSIS — N39.0 URINARY TRACT INFECTION, SITE NOT SPECIFIED: ICD-10-CM

## 2025-02-22 LAB
APPEARANCE UR: CLEAR
BILIRUB UR STRIP.AUTO-MCNC: NEGATIVE MG/DL
COLOR UR: YELLOW
GLUCOSE UR STRIP.AUTO-MCNC: ABNORMAL MG/DL
KETONES UR STRIP.AUTO-MCNC: NEGATIVE MG/DL
LEUKOCYTE ESTERASE UR QL STRIP.AUTO: ABNORMAL
MUCOUS THREADS #/AREA URNS AUTO: ABNORMAL /LPF
NITRITE UR QL STRIP.AUTO: NEGATIVE
PH UR STRIP.AUTO: 6 [PH]
PROT UR STRIP.AUTO-MCNC: NEGATIVE MG/DL
RBC # UR STRIP.AUTO: NEGATIVE MG/DL
RBC #/AREA URNS AUTO: ABNORMAL /HPF
SP GR UR STRIP.AUTO: 1.01
SQUAMOUS #/AREA URNS AUTO: ABNORMAL /HPF
UROBILINOGEN UR STRIP.AUTO-MCNC: ABNORMAL MG/DL
WBC #/AREA URNS AUTO: ABNORMAL /HPF

## 2025-02-23 LAB — BACTERIA UR CULT: NORMAL

## 2025-02-24 ENCOUNTER — PATIENT MESSAGE (OUTPATIENT)
Dept: PRIMARY CARE | Facility: CLINIC | Age: 85
End: 2025-02-24
Payer: MEDICARE

## 2025-02-24 DIAGNOSIS — E11.42 TYPE 2 DIABETES MELLITUS WITH DIABETIC POLYNEUROPATHY, WITHOUT LONG-TERM CURRENT USE OF INSULIN: Primary | ICD-10-CM

## 2025-02-24 DIAGNOSIS — N18.31 STAGE 3A CHRONIC KIDNEY DISEASE (MULTI): ICD-10-CM

## 2025-02-24 RX ORDER — DOCUSATE SODIUM 100 MG/1
100 CAPSULE, LIQUID FILLED ORAL 2 TIMES DAILY PRN
Qty: 60 CAPSULE | Refills: 0 | Status: SHIPPED | OUTPATIENT
Start: 2025-02-24

## 2025-02-28 ENCOUNTER — TELEPHONE (OUTPATIENT)
Dept: PRIMARY CARE | Facility: CLINIC | Age: 85
End: 2025-02-28
Payer: MEDICARE

## 2025-02-28 DIAGNOSIS — G30.1 ALZHEIMER'S DISEASE WITH LATE ONSET (MULTI): Primary | ICD-10-CM

## 2025-02-28 DIAGNOSIS — R45.1 AGITATION: ICD-10-CM

## 2025-02-28 DIAGNOSIS — F41.9 ANXIETY: ICD-10-CM

## 2025-02-28 DIAGNOSIS — F02.80 ALZHEIMER'S DISEASE WITH LATE ONSET (MULTI): Primary | ICD-10-CM

## 2025-02-28 RX ORDER — HYDROXYZINE HYDROCHLORIDE 10 MG/1
10 TABLET, FILM COATED ORAL EVERY 8 HOURS PRN
Qty: 90 TABLET | Refills: 0 | Status: SHIPPED | OUTPATIENT
Start: 2025-02-28 | End: 2025-03-30

## 2025-02-28 RX ORDER — CITALOPRAM 10 MG/1
10 TABLET ORAL DAILY
Qty: 90 TABLET | Refills: 0 | Status: SHIPPED | OUTPATIENT
Start: 2025-02-28 | End: 2025-05-29

## 2025-02-28 NOTE — TELEPHONE ENCOUNTER
The psychiatrist may change medication once they meet with patient.    Since patient is not at risk for falls at this time will first try 10 mg Hydroxyzine as needed up to TID for anxiety and agitation. Also will start daily anxiety medication called Celexa at 10 mg once a day.

## 2025-02-28 NOTE — TELEPHONE ENCOUNTER
Spoke with Barbara at [facility], confirmed verbal referral and faxed over.     She also said the patients ajitation is all day and all night, she is wandering a lot and has a lot of anxiety. She is non combative and they state she is very steady on her feet with little to no risk of falls.       Confirmed we would fax referral and  RX to [phone]

## 2025-02-28 NOTE — TELEPHONE ENCOUNTER
Is agitation more at night because there is a good medication for agitation/sleep I would like to use for her, but can only be used in evening at night since is sedating as well.

## 2025-02-28 NOTE — TELEPHONE ENCOUNTER
Nurse Jimenez from [facility] -- states Pt is having increased anxiety , and walking into other pt's rooms - x1 month     >Requesting PRN medication for anxiety   >Requesting Psychiatry referral (general)

## 2025-03-01 ENCOUNTER — LAB REQUISITION (OUTPATIENT)
Dept: LAB | Facility: HOSPITAL | Age: 85
End: 2025-03-01
Payer: MEDICARE

## 2025-03-01 DIAGNOSIS — N18.31 CHRONIC KIDNEY DISEASE, STAGE 3A (MULTI): ICD-10-CM

## 2025-03-01 DIAGNOSIS — E11.42 TYPE 2 DIABETES MELLITUS WITH DIABETIC POLYNEUROPATHY: ICD-10-CM

## 2025-03-01 LAB
ALBUMIN SERPL BCP-MCNC: 3.8 G/DL (ref 3.4–5)
ALP SERPL-CCNC: 78 U/L (ref 33–136)
ALT SERPL W P-5'-P-CCNC: 11 U/L (ref 7–45)
ANION GAP SERPL CALC-SCNC: 16 MMOL/L (ref 10–20)
AST SERPL W P-5'-P-CCNC: 15 U/L (ref 9–39)
BASOPHILS # BLD AUTO: 0.05 X10*3/UL (ref 0–0.1)
BASOPHILS NFR BLD AUTO: 0.9 %
BILIRUB SERPL-MCNC: 0.9 MG/DL (ref 0–1.2)
BUN SERPL-MCNC: 7 MG/DL (ref 6–23)
CALCIUM SERPL-MCNC: 9 MG/DL (ref 8.6–10.3)
CHLORIDE SERPL-SCNC: 97 MMOL/L (ref 98–107)
CHOLEST SERPL-MCNC: 132 MG/DL (ref 0–199)
CHOLESTEROL/HDL RATIO: 2
CO2 SERPL-SCNC: 23 MMOL/L (ref 21–32)
CREAT SERPL-MCNC: 0.97 MG/DL (ref 0.5–1.05)
EGFRCR SERPLBLD CKD-EPI 2021: 58 ML/MIN/1.73M*2
EOSINOPHIL # BLD AUTO: 0.09 X10*3/UL (ref 0–0.4)
EOSINOPHIL NFR BLD AUTO: 1.6 %
ERYTHROCYTE [DISTWIDTH] IN BLOOD BY AUTOMATED COUNT: 14.3 % (ref 11.5–14.5)
EST. AVERAGE GLUCOSE BLD GHB EST-MCNC: 180 MG/DL
GLUCOSE SERPL-MCNC: 286 MG/DL (ref 74–99)
HBA1C MFR BLD: 7.9 %
HCT VFR BLD AUTO: 42.2 % (ref 36–46)
HDLC SERPL-MCNC: 66.3 MG/DL
HGB BLD-MCNC: 13.8 G/DL (ref 12–16)
IMM GRANULOCYTES # BLD AUTO: 0.02 X10*3/UL (ref 0–0.5)
IMM GRANULOCYTES NFR BLD AUTO: 0.4 % (ref 0–0.9)
LDLC SERPL CALC-MCNC: 44 MG/DL
LYMPHOCYTES # BLD AUTO: 1.01 X10*3/UL (ref 0.8–3)
LYMPHOCYTES NFR BLD AUTO: 18.3 %
MCH RBC QN AUTO: 29.4 PG (ref 26–34)
MCHC RBC AUTO-ENTMCNC: 32.7 G/DL (ref 32–36)
MCV RBC AUTO: 90 FL (ref 80–100)
MONOCYTES # BLD AUTO: 0.32 X10*3/UL (ref 0.05–0.8)
MONOCYTES NFR BLD AUTO: 5.8 %
NEUTROPHILS # BLD AUTO: 4.03 X10*3/UL (ref 1.6–5.5)
NEUTROPHILS NFR BLD AUTO: 73 %
NON HDL CHOLESTEROL: 66 MG/DL (ref 0–149)
NRBC BLD-RTO: 0 /100 WBCS (ref 0–0)
PLATELET # BLD AUTO: 155 X10*3/UL (ref 150–450)
POTASSIUM SERPL-SCNC: 4.3 MMOL/L (ref 3.5–5.3)
PROT SERPL-MCNC: 6.8 G/DL (ref 6.4–8.2)
RBC # BLD AUTO: 4.7 X10*6/UL (ref 4–5.2)
SODIUM SERPL-SCNC: 132 MMOL/L (ref 136–145)
TRIGL SERPL-MCNC: 109 MG/DL (ref 0–149)
VLDL: 22 MG/DL (ref 0–40)
WBC # BLD AUTO: 5.5 X10*3/UL (ref 4.4–11.3)

## 2025-03-01 PROCEDURE — 80061 LIPID PANEL: CPT | Mod: OUT

## 2025-03-01 PROCEDURE — 83036 HEMOGLOBIN GLYCOSYLATED A1C: CPT | Mod: OUT,GEALAB

## 2025-03-01 PROCEDURE — 85025 COMPLETE CBC W/AUTO DIFF WBC: CPT | Mod: OUT

## 2025-03-01 PROCEDURE — 80053 COMPREHEN METABOLIC PANEL: CPT | Mod: OUT

## 2025-03-05 DIAGNOSIS — F41.9 ANXIETY: ICD-10-CM

## 2025-03-05 DIAGNOSIS — F02.80 ALZHEIMER'S DISEASE WITH LATE ONSET (MULTI): ICD-10-CM

## 2025-03-05 DIAGNOSIS — G30.1 ALZHEIMER'S DISEASE WITH LATE ONSET (MULTI): ICD-10-CM

## 2025-03-05 DIAGNOSIS — R45.1 AGITATION: ICD-10-CM

## 2025-03-05 RX ORDER — HYDROXYZINE HYDROCHLORIDE 10 MG/1
10 TABLET, FILM COATED ORAL EVERY 8 HOURS PRN
Qty: 90 TABLET | Refills: 0 | OUTPATIENT
Start: 2025-03-05 | End: 2025-04-04

## 2025-03-05 RX ORDER — CITALOPRAM 10 MG/1
10 TABLET ORAL DAILY
Qty: 90 TABLET | Refills: 0 | OUTPATIENT
Start: 2025-03-05 | End: 2025-06-03

## 2025-04-09 ENCOUNTER — APPOINTMENT (OUTPATIENT)
Dept: PRIMARY CARE | Facility: CLINIC | Age: 85
End: 2025-04-09
Payer: MEDICARE

## 2025-04-09 VITALS
BODY MASS INDEX: 22.28 KG/M2 | WEIGHT: 121.8 LBS | OXYGEN SATURATION: 97 % | SYSTOLIC BLOOD PRESSURE: 116 MMHG | HEART RATE: 76 BPM | DIASTOLIC BLOOD PRESSURE: 64 MMHG

## 2025-04-09 DIAGNOSIS — Z00.00 ROUTINE GENERAL MEDICAL EXAMINATION AT HEALTH CARE FACILITY: Primary | ICD-10-CM

## 2025-04-09 DIAGNOSIS — F02.80 ALZHEIMER'S DISEASE WITH LATE ONSET (MULTI): ICD-10-CM

## 2025-04-09 DIAGNOSIS — R00.0 TACHYCARDIA: ICD-10-CM

## 2025-04-09 DIAGNOSIS — G30.1 ALZHEIMER'S DISEASE WITH LATE ONSET (MULTI): ICD-10-CM

## 2025-04-09 DIAGNOSIS — E11.42 TYPE 2 DIABETES MELLITUS WITH DIABETIC POLYNEUROPATHY, WITHOUT LONG-TERM CURRENT USE OF INSULIN: ICD-10-CM

## 2025-04-09 DIAGNOSIS — E78.2 MIXED HYPERLIPIDEMIA: ICD-10-CM

## 2025-04-09 DIAGNOSIS — N18.31 STAGE 3A CHRONIC KIDNEY DISEASE (MULTI): ICD-10-CM

## 2025-04-09 PROCEDURE — 99214 OFFICE O/P EST MOD 30 MIN: CPT

## 2025-04-09 PROCEDURE — 1159F MED LIST DOCD IN RCRD: CPT

## 2025-04-09 PROCEDURE — 99214 OFFICE O/P EST MOD 30 MIN: CPT | Mod: 25

## 2025-04-09 PROCEDURE — G0439 PPPS, SUBSEQ VISIT: HCPCS

## 2025-04-09 PROCEDURE — 1157F ADVNC CARE PLAN IN RCRD: CPT

## 2025-04-09 PROCEDURE — 90677 PCV20 VACCINE IM: CPT

## 2025-04-09 PROCEDURE — 1123F ACP DISCUSS/DSCN MKR DOCD: CPT

## 2025-04-09 PROCEDURE — 99215 OFFICE O/P EST HI 40 MIN: CPT | Mod: 25

## 2025-04-09 PROCEDURE — 1126F AMNT PAIN NOTED NONE PRSNT: CPT

## 2025-04-09 PROCEDURE — 1158F ADVNC CARE PLAN TLK DOCD: CPT

## 2025-04-09 PROCEDURE — 99397 PER PM REEVAL EST PAT 65+ YR: CPT | Mod: 25

## 2025-04-09 PROCEDURE — 99397 PER PM REEVAL EST PAT 65+ YR: CPT

## 2025-04-09 PROCEDURE — 1036F TOBACCO NON-USER: CPT

## 2025-04-09 PROCEDURE — 1170F FXNL STATUS ASSESSED: CPT

## 2025-04-09 RX ORDER — DONEPEZIL HYDROCHLORIDE 10 MG/1
10 TABLET, FILM COATED ORAL NIGHTLY
Qty: 90 TABLET | Refills: 1 | Status: SHIPPED | OUTPATIENT
Start: 2025-04-09

## 2025-04-09 RX ORDER — HYDROXYZINE HYDROCHLORIDE 25 MG/1
25 TABLET, FILM COATED ORAL EVERY 8 HOURS PRN
COMMUNITY

## 2025-04-09 RX ORDER — SERTRALINE HYDROCHLORIDE 50 MG/1
50 TABLET, FILM COATED ORAL
COMMUNITY
Start: 2025-03-20

## 2025-04-09 RX ORDER — DONEPEZIL HYDROCHLORIDE 5 MG/1
10 TABLET, FILM COATED ORAL NIGHTLY
Qty: 90 TABLET | Refills: 1 | Status: SHIPPED | OUTPATIENT
Start: 2025-04-09 | End: 2025-04-09

## 2025-04-09 RX ORDER — METFORMIN HYDROCHLORIDE 500 MG/1
500 TABLET ORAL
Qty: 90 TABLET | Refills: 1 | Status: SHIPPED | OUTPATIENT
Start: 2025-04-09 | End: 2025-10-06

## 2025-04-09 ASSESSMENT — ENCOUNTER SYMPTOMS
COUGH: 0
VOMITING: 0
BLOOD IN STOOL: 0
DYSURIA: 0
WHEEZING: 0
FEVER: 0
DIAPHORESIS: 0
MYALGIAS: 0
NAUSEA: 0
CONFUSION: 1
AGITATION: 0
DEPRESSION: 0
SHORTNESS OF BREATH: 0
OCCASIONAL FEELINGS OF UNSTEADINESS: 0
SORE THROAT: 0
LOSS OF SENSATION IN FEET: 0
DIFFICULTY URINATING: 0
ADENOPATHY: 0
HEMATURIA: 0
DIARRHEA: 0
PALPITATIONS: 0

## 2025-04-09 ASSESSMENT — ACTIVITIES OF DAILY LIVING (ADL)
PREPARING MEALS: INDEPENDENT
BATHING: INDEPENDENT
USING TRANSPORTATION: INDEPENDENT
MANAGING FINANCES: INDEPENDENT
DRESSING: INDEPENDENT
USING TELEPHONE: INDEPENDENT
TOILETING: INDEPENDENT
FEEDING: INDEPENDENT
DOING HOUSEWORK: INDEPENDENT
STIL DRIVING: YES
ADEQUATE_TO_COMPLETE_ADL: YES
PILL BOX USED: YES
TAKING MEDICATION: INDEPENDENT
JUDGMENT_ADEQUATE_SAFELY_COMPLETE_DAILY_ACTIVITIES: YES
GROCERY SHOPPING: INDEPENDENT
NEEDS ASSISTANCE WITH FOOD: INDEPENDENT
EATING: INDEPENDENT

## 2025-04-09 ASSESSMENT — PATIENT HEALTH QUESTIONNAIRE - PHQ9
SUM OF ALL RESPONSES TO PHQ9 QUESTIONS 1 AND 2: 0
1. LITTLE INTEREST OR PLEASURE IN DOING THINGS: NOT AT ALL
2. FEELING DOWN, DEPRESSED OR HOPELESS: NOT AT ALL

## 2025-04-09 ASSESSMENT — COLUMBIA-SUICIDE SEVERITY RATING SCALE - C-SSRS
1. IN THE PAST MONTH, HAVE YOU WISHED YOU WERE DEAD OR WISHED YOU COULD GO TO SLEEP AND NOT WAKE UP?: NO
2. HAVE YOU ACTUALLY HAD ANY THOUGHTS OF KILLING YOURSELF?: NO
6. HAVE YOU EVER DONE ANYTHING, STARTED TO DO ANYTHING, OR PREPARED TO DO ANYTHING TO END YOUR LIFE?: NO

## 2025-04-09 ASSESSMENT — COGNITIVE AND FUNCTIONAL STATUS - GENERAL
TRAIL MAKING TEST: PATIENT DOES NOT COMPLETE TRAIL MAKING TEST PROPERLY.
VERBAL FLUENCY - ANIMAL NAMES (0 TO 25): 0

## 2025-04-09 ASSESSMENT — PAIN SCALES - GENERAL: PAINLEVEL_OUTOF10: 0-NO PAIN

## 2025-04-09 NOTE — PROGRESS NOTES
[facility]: FAMILY MEDICINE  MEDICARE WELLNESS EXAM      Mariya Mckeon is a 84 y.o. female that is presenting today for Annual Exam (Pt is here for medicare physical / nr).    Subjective   Hx of NIDDM, Alzheimer's disease    Other providers: Podiatrist, for nail trimming, Psychiatrist Dr. Bond (rx for sertraline, hydroxyzine)    Daughter/POA of patient has no concerns today outside of elevated A1c and would like to increase to Aricept to 10 mg.    Alzheimer's disease: was originally on Aricept 10 mg and 28 mg Namenda ER. Daughter felt medication was no make difference so tapered off Aricept. However, daughter asked for patient to be put back on Aricept when noticed decline in cognition. Placed back on 5 mg daily 1/2025.  Was also having increase agitation throughout day to started on Celexa 10 mg, and Hydroxyzine TID, referral to psychiatry made. Psychiatrist changed Celexa to 50 mg Sertraline and increased to 25 mg hydroxyzine. Agitation has significantly improved.    HLD: stable on 20 mg Atorvastatin. Tolerating medication with no side effects. Last lipid ordered.    DM with neuropathy: Last  A1c 7.1 (10/3/2024) --> 7.9 (4/9/2025). On 10 mg Glipizide BID. Also on Gabapentin 300 mg BID. Since living in memory Care, diet less strict then living with daughter. Patient eating more deserts. Last microalbumin ordered, and GFR 58 (3/2025). On statin yes, no ACEI. Last eye exam due (declined). Last foot exam through podiatrist, comes to facility to trip nails. PNA UTD. Denies hypoglycemic incidents, polyphagia, polydipsia, polyuria.       Tachycardia: controlled with Metoprolol 50 mg.     SHx: Lives at [facility]        Denies changes or concerns with hearing.   Review of Systems   Constitutional:  Negative for diaphoresis and fever.   HENT:  Negative for congestion, hearing loss and sore throat.    Eyes:  Negative for visual disturbance.   Respiratory:  Negative for cough, shortness of  breath and wheezing.    Cardiovascular:  Negative for chest pain, palpitations and leg swelling.   Gastrointestinal:  Negative for blood in stool, diarrhea, nausea and vomiting.   Genitourinary:  Negative for difficulty urinating, dysuria and hematuria.   Musculoskeletal:  Negative for myalgias.   Skin:  Negative for rash.   Allergic/Immunologic: Negative for immunocompromised state.   Neurological:  Negative for syncope.   Hematological:  Negative for adenopathy.   Psychiatric/Behavioral:  Positive for confusion. Negative for agitation.      ACTIVITIES OF DAILY LIVING:  Basic ADLs:  Problems with Bathing, Dressing, Toileting, Transferring, Continence, Feeding Yes  Instrumental ADLs:  Problems with Ability to use phone, Shopping, Cooking, House-keeping, Laundry, Transportation, Medication Management, Finance Management Yes    Advance Care Planning   Advanced Care Planning was discussed with patient:  The patient has an active advanced care plan on file. The patient has an active surrogate decision-maker on file.    History    Past Medical History:   Diagnosis Date    Personal history of diseases of the blood and blood-forming organs and certain disorders involving the immune mechanism     History of anemia    Personal history of other infectious and parasitic diseases     History of hepatitis     Past Surgical History:   Procedure Laterality Date    TONSILLECTOMY  11/26/2013    Tonsillectomy    TOTAL ABDOMINAL HYSTERECTOMY  12/01/2014    Total Abdominal Hysterectomy     Family History   Problem Relation Name Age of Onset    Alzheimer's disease Mother      Heart attack Father      Multiple sclerosis Sister      Other (stillborn) Son      Other (meningitis) Son      Heart attack Son      Lung cancer Son       No Known Allergies  Current Outpatient Medications on File Prior to Visit   Medication Sig Dispense Refill    atorvastatin (Lipitor) 20 mg tablet TAKE 1 TABLET BY MOUTH ONCE DAILY 90 tablet 3    docusate sodium  (Colace) 100 mg capsule Take 1 capsule (100 mg) by mouth 2 times a day as needed for constipation. 60 capsule 0    gabapentin (Neurontin) 300 mg capsule Take 1 capsule (300 mg) by mouth 2 times a day. 180 capsule 1    glipiZIDE (Glucotrol) 5 mg tablet Take 2 tablets (10 mg) by mouth 2 times a day. 360 tablet 1    hydrOXYzine HCL (Atarax) 25 mg tablet Take 1 tablet (25 mg) by mouth every 8 hours if needed for itching.      memantine (Namenda) 28 mg capsule,sprinkle,ER 24hr Take 1 capsule (28 mg) by mouth once daily. 90 capsule 1    metoprolol succinate XL (Toprol-XL) 50 mg 24 hr tablet TAKE 1 TABLET BY MOUTH ONCE DAILY 90 tablet 3    sertraline (Zoloft) 50 mg tablet Take 1 tablet (50 mg) by mouth once daily in the morning. Take before meals.      [DISCONTINUED] donepezil (Aricept) 5 mg tablet Take 1 tablet (5 mg) by mouth once daily at bedtime. 90 tablet 1    [DISCONTINUED] citalopram (CeleXA) 10 mg tablet Take 1 tablet (10 mg) by mouth once daily. 90 tablet 0    [DISCONTINUED] hydrOXYzine HCL (Atarax) 10 mg tablet Take 1 tablet (10 mg) by mouth every 8 hours if needed for anxiety. 90 tablet 0     No current facility-administered medications on file prior to visit.     Immunization History   Administered Date(s) Administered    Flu vaccine, quadrivalent, high-dose, preservative free, age 65y+ (FLUZONE) 10/29/2020, 11/01/2021, 11/03/2022, 10/02/2023    Flu vaccine, trivalent, preservative free, HIGH-DOSE, age 65y+ (Fluzone) 10/04/2018, 10/29/2019, 11/01/2021, 11/03/2022, 10/03/2024    Flu vaccine, trivalent, preservative free, age 6 months and greater (Fluarix/Fluzone/Flulaval) 09/22/2015    Influenza, Unspecified 10/06/2011, 10/25/2012, 09/01/2013, 10/16/2014    Influenza, injectable, quadrivalent 10/27/2016, 09/25/2017    Influenza, seasonal, injectable 09/24/2013    Moderna COVID-19 vaccine, 12 years and older (50mcg/0.5mL)(Spikevax) 10/21/2023, 10/03/2024    Moderna COVID-19 vaccine, bivalent, blue cap/gray label  *Check age/dose* 11/22/2022    Moderna SARS-CoV-2 Vaccination 01/29/2021, 02/26/2021, 10/27/2021, 05/06/2022    Pneumococcal conjugate vaccine, 13-valent (PREVNAR 13) 09/25/2017    Pneumococcal conjugate vaccine, 20-valent (PREVNAR 20) 04/09/2025    Pneumococcal polysaccharide vaccine, 23-valent, age 2 years and older (PNEUMOVAX 23) 01/01/2007, 11/14/2007     Patient's medical history was reviewed and updated either before or during this encounter.    Objective   Vitals:    04/09/25 1306   BP: 116/64   Pulse: 76   SpO2: 97%      Physical Exam  Constitutional:       General: She is not in acute distress.     Appearance: Normal appearance.   HENT:      Right Ear: Tympanic membrane and ear canal normal.      Left Ear: Tympanic membrane and ear canal normal.      Nose: Nose normal.      Mouth/Throat:      Mouth: Mucous membranes are moist.      Pharynx: Oropharynx is clear. No oropharyngeal exudate or posterior oropharyngeal erythema.   Eyes:      Extraocular Movements: Extraocular movements intact.      Conjunctiva/sclera: Conjunctivae normal.      Pupils: Pupils are equal, round, and reactive to light.   Neck:      Thyroid: No thyroid mass or thyromegaly.      Vascular: No carotid bruit.   Cardiovascular:      Rate and Rhythm: Normal rate and regular rhythm.      Pulses: Normal pulses.      Heart sounds: No murmur heard.     No gallop.   Pulmonary:      Effort: Pulmonary effort is normal.      Breath sounds: No wheezing, rhonchi or rales.   Abdominal:      General: Bowel sounds are normal.      Palpations: Abdomen is soft. There is no hepatomegaly, splenomegaly or mass.      Tenderness: There is no abdominal tenderness. There is no guarding.   Musculoskeletal:         General: Normal range of motion.      Right lower leg: No edema.      Left lower leg: No edema.   Lymphadenopathy:      Cervical: No cervical adenopathy.   Skin:     General: Skin is warm.      Findings: No rash.   Neurological:      General: No focal  deficit present.      Mental Status: She is alert.      Motor: Motor function is intact. No weakness.   Psychiatric:         Mood and Affect: Mood and affect normal.         Behavior: Behavior is not agitated. Behavior is cooperative.         Thought Content: Thought content normal.         Cognition and Memory: Cognition is impaired. Memory is impaired.      Comments: Able to follow directions and answer straightforward yes and no questions         Assessment/Plan    Diagnoses and all orders for this visit:  Routine general medical examination at health care facility  -     1 Year Follow Up In Primary Care - Wellness Exam; Future  Type 2 diabetes mellitus with diabetic polyneuropathy, without long-term current use of insulin  -     metFORMIN (Glucophage) 500 mg tablet; Take 1 tablet (500 mg) by mouth once daily with breakfast.  -     Albumin-Creatinine Ratio, Urine Random; Future  -     Basic metabolic panel; Future  Stage 3a chronic kidney disease (Multi)  Mixed hyperlipidemia  Tachycardia  Alzheimer's disease with late onset (Multi)  -     donepezil (Aricept) 10 mg tablet; Take 1 tablet (10 mg) by mouth once daily at bedtime.  Other orders  -     Pneumococcal conjugate vaccine, 20-valent (PREVNAR 20)    Start Metformin for elevated A1c and diabetic protein shake to help maintain weight. Increase Aricept to 10 mg. Re-checking sodium levels on blood-work. Follow-up 6 months.     Patient Active Problem List   Diagnosis    Alzheimer's disease with late onset (Multi)    Type 2 diabetes mellitus with diabetic polyneuropathy, without long-term current use of insulin    Stage 3a chronic kidney disease (Multi)    Mixed hyperlipidemia    Tachycardia       The patient was encouraged to ensure that any/all documentation is accurate and up to date, and that our office be provided a copy in the event that anything changes.    Lisa Barger PA-C

## 2025-04-10 ENCOUNTER — LAB REQUISITION (OUTPATIENT)
Dept: LAB | Facility: HOSPITAL | Age: 85
End: 2025-04-10
Payer: MEDICARE

## 2025-04-10 DIAGNOSIS — E11.9 TYPE 2 DIABETES MELLITUS WITHOUT COMPLICATIONS: ICD-10-CM

## 2025-04-10 DIAGNOSIS — I10 ESSENTIAL (PRIMARY) HYPERTENSION: ICD-10-CM

## 2025-04-10 LAB
ANION GAP SERPL CALC-SCNC: 16 MMOL/L (ref 10–20)
BUN SERPL-MCNC: 8 MG/DL (ref 6–23)
CALCIUM SERPL-MCNC: 8.8 MG/DL (ref 8.6–10.3)
CHLORIDE SERPL-SCNC: 101 MMOL/L (ref 98–107)
CO2 SERPL-SCNC: 22 MMOL/L (ref 21–32)
CREAT SERPL-MCNC: 0.88 MG/DL (ref 0.5–1.05)
EGFRCR SERPLBLD CKD-EPI 2021: 65 ML/MIN/1.73M*2
GLUCOSE SERPL-MCNC: 156 MG/DL (ref 74–99)
POTASSIUM SERPL-SCNC: 4.4 MMOL/L (ref 3.5–5.3)
SODIUM SERPL-SCNC: 135 MMOL/L (ref 136–145)

## 2025-04-10 PROCEDURE — 36415 COLL VENOUS BLD VENIPUNCTURE: CPT | Mod: OUT

## 2025-04-10 PROCEDURE — 80048 BASIC METABOLIC PNL TOTAL CA: CPT | Mod: OUT

## 2025-06-03 DIAGNOSIS — E11.42 TYPE 2 DIABETES MELLITUS WITH DIABETIC POLYNEUROPATHY, WITHOUT LONG-TERM CURRENT USE OF INSULIN: ICD-10-CM

## 2025-06-03 DIAGNOSIS — F02.80 ALZHEIMER'S DISEASE WITH LATE ONSET (MULTI): ICD-10-CM

## 2025-06-03 DIAGNOSIS — G30.1 ALZHEIMER'S DISEASE WITH LATE ONSET (MULTI): ICD-10-CM

## 2025-06-03 DIAGNOSIS — E11.9 TYPE 2 DIABETES MELLITUS WITHOUT COMPLICATIONS: ICD-10-CM

## 2025-06-03 RX ORDER — GABAPENTIN 300 MG/1
300 CAPSULE ORAL 2 TIMES DAILY
Qty: 180 CAPSULE | Refills: 1 | Status: SHIPPED | OUTPATIENT
Start: 2025-06-03

## 2025-06-03 RX ORDER — GLIPIZIDE 5 MG/1
10 TABLET ORAL 2 TIMES DAILY
Qty: 360 TABLET | Refills: 1 | Status: SHIPPED | OUTPATIENT
Start: 2025-06-03

## 2025-06-03 RX ORDER — MEMANTINE HYDROCHLORIDE 28 MG/1
28 CAPSULE, EXTENDED RELEASE ORAL DAILY
Qty: 90 CAPSULE | Refills: 1 | Status: SHIPPED | OUTPATIENT
Start: 2025-06-03

## 2025-06-10 DIAGNOSIS — K64.9 HEMORRHOIDS, UNSPECIFIED HEMORRHOID TYPE: Primary | ICD-10-CM

## 2025-06-10 RX ORDER — PHENYLEPHRINE HCL, WITCH HAZEL 2.5; 5 MG/G; MG/G
1 GEL TOPICAL 2 TIMES DAILY PRN
Qty: 51 G | Refills: 1 | Status: SHIPPED | OUTPATIENT
Start: 2025-06-10

## 2025-07-07 DIAGNOSIS — I10 PRIMARY HYPERTENSION: ICD-10-CM

## 2025-07-07 DIAGNOSIS — E78.2 MIXED HYPERLIPIDEMIA: ICD-10-CM

## 2025-07-07 DIAGNOSIS — E11.9 TYPE 2 DIABETES MELLITUS WITHOUT COMPLICATIONS: ICD-10-CM

## 2025-07-07 RX ORDER — METOPROLOL SUCCINATE 50 MG/1
50 TABLET, EXTENDED RELEASE ORAL DAILY
Qty: 90 TABLET | Refills: 1 | Status: SHIPPED | OUTPATIENT
Start: 2025-07-07

## 2025-07-07 RX ORDER — ATORVASTATIN CALCIUM 20 MG/1
20 TABLET, FILM COATED ORAL DAILY
Qty: 90 TABLET | Refills: 1 | Status: SHIPPED | OUTPATIENT
Start: 2025-07-07

## 2025-08-06 ENCOUNTER — HOME VISIT (OUTPATIENT)
Dept: POST ACUTE CARE | Facility: EXTERNAL LOCATION | Age: 85
End: 2025-08-06
Payer: MEDICARE

## 2025-08-06 VITALS
HEART RATE: 57 BPM | WEIGHT: 117.2 LBS | BODY MASS INDEX: 21.44 KG/M2 | TEMPERATURE: 96.8 F | RESPIRATION RATE: 18 BRPM | SYSTOLIC BLOOD PRESSURE: 129 MMHG | DIASTOLIC BLOOD PRESSURE: 66 MMHG

## 2025-08-06 DIAGNOSIS — N18.31 STAGE 3A CHRONIC KIDNEY DISEASE (MULTI): ICD-10-CM

## 2025-08-06 DIAGNOSIS — E11.42 TYPE 2 DIABETES MELLITUS WITH DIABETIC POLYNEUROPATHY, WITHOUT LONG-TERM CURRENT USE OF INSULIN: ICD-10-CM

## 2025-08-06 DIAGNOSIS — F02.80 ALZHEIMER'S DISEASE WITH LATE ONSET (MULTI): Primary | ICD-10-CM

## 2025-08-06 DIAGNOSIS — G30.1 ALZHEIMER'S DISEASE WITH LATE ONSET (MULTI): Primary | ICD-10-CM

## 2025-08-06 PROCEDURE — 99342 HOME/RES VST NEW LOW MDM 30: CPT | Performed by: FAMILY MEDICINE

## 2025-08-06 ASSESSMENT — ENCOUNTER SYMPTOMS
PALPITATIONS: 0
VOMITING: 0
UNEXPECTED WEIGHT CHANGE: 0
CONSTIPATION: 0

## 2025-08-06 NOTE — PROGRESS NOTES
Subjective   Patient ID: Mariya Mckeon is a 85 y.o. female who is assisted living/ home patient being seen and evaluated for multiple medical problems.    HPI   New admission to [facility]  Patient has no complaints of   She seems to be settling well per nursing    Review of Systems   Constitutional:  Negative for unexpected weight change.   HENT:  Negative for congestion and ear discharge.    Cardiovascular:  Negative for chest pain and palpitations.   Gastrointestinal:  Negative for constipation and vomiting.   All other systems reviewed and are negative.      Objective   /66   Pulse 57   Temp 36 °C (96.8 °F)   Resp 18   Wt 53.2 kg (117 lb 3.2 oz)   BMI 21.44 kg/m²     Physical Exam  Constitutional:       General: She is not in acute distress.     Appearance: Normal appearance.   HENT:      Head: Normocephalic and atraumatic.     Eyes:      Conjunctiva/sclera: Conjunctivae normal.       Cardiovascular:      Rate and Rhythm: Normal rate and regular rhythm.   Pulmonary:      Effort: Pulmonary effort is normal.      Breath sounds: Normal breath sounds.   Abdominal:      Palpations: Abdomen is soft.     Musculoskeletal:      Cervical back: Neck supple.   Lymphadenopathy:      Cervical: No cervical adenopathy.     Neurological:      Mental Status: She is alert.         Assessment/Plan   Problem List Items Addressed This Visit           ICD-10-CM    Alzheimer's disease with late onset (Multi) - Primary G30.1, F02.80    Patient now in 24-hour care memory care area of assisted living         Type 2 diabetes mellitus with diabetic polyneuropathy, without long-term current use of insulin E11.42    Monitor         Stage 3a chronic kidney disease (Multi) N18.31    Monitor        New admission to [facility] memory care unit  Reviewed labs  Recheck 3 weeks sooner if any issues

## 2025-10-01 ENCOUNTER — APPOINTMENT (OUTPATIENT)
Dept: PRIMARY CARE | Facility: CLINIC | Age: 85
End: 2025-10-01
Payer: MEDICARE